# Patient Record
Sex: FEMALE | Race: WHITE | NOT HISPANIC OR LATINO | URBAN - METROPOLITAN AREA
[De-identification: names, ages, dates, MRNs, and addresses within clinical notes are randomized per-mention and may not be internally consistent; named-entity substitution may affect disease eponyms.]

---

## 2018-09-10 ENCOUNTER — EMERGENCY (EMERGENCY)
Facility: HOSPITAL | Age: 28
LOS: 1 days | Discharge: HOME | End: 2018-09-10
Attending: EMERGENCY MEDICINE
Payer: COMMERCIAL

## 2018-09-10 VITALS
TEMPERATURE: 98 F | OXYGEN SATURATION: 99 % | RESPIRATION RATE: 18 BRPM | HEART RATE: 84 BPM | DIASTOLIC BLOOD PRESSURE: 90 MMHG | SYSTOLIC BLOOD PRESSURE: 139 MMHG

## 2018-09-10 VITALS — HEIGHT: 68 IN | WEIGHT: 179.9 LBS

## 2018-09-10 PROCEDURE — 76642 ULTRASOUND BREAST LIMITED: CPT

## 2018-09-10 PROCEDURE — 76642 ULTRASOUND BREAST LIMITED: CPT | Mod: 26,LT

## 2018-09-10 PROCEDURE — 99284 EMERGENCY DEPT VISIT MOD MDM: CPT

## 2018-09-10 RX ORDER — IBUPROFEN 200 MG
600 TABLET ORAL ONCE
Qty: 0 | Refills: 0 | Status: COMPLETED | OUTPATIENT
Start: 2018-09-10 | End: 2018-09-10

## 2018-09-10 RX ORDER — AZTREONAM 2 G
1 VIAL (EA) INJECTION
Qty: 20 | Refills: 0 | OUTPATIENT
Start: 2018-09-10 | End: 2018-09-19

## 2018-09-10 RX ADMIN — Medication 600 MILLIGRAM(S): at 23:56

## 2018-09-10 RX ADMIN — Medication 1 TABLET(S): at 23:56

## 2018-09-10 NOTE — ED STATDOCS - PROGRESS NOTE DETAILS
ANNA LIU: PT evaluated by intake physician. HPI/PE/ROS as noted above. Will follow up plan per intake physician  pending sono for abscess vs cystic structure multiple attempts to contact [facility] and [facility] without success  ALLI GAMEZ, instructed on warm soaks and fu with GYN and referral to [facility] and [facility]

## 2018-09-10 NOTE — ED STATDOCS - NS_ ATTENDINGSCRIBEDETAILS _ED_A_ED_FT
I, Yanely Chowdhury, performed the initial face to face bedside interview with this patient regarding history of present illness, review of symptoms and relevant past medical, social and family history.  I completed an independent physical examination.  I was the initial provider who evaluated this patient. I have signed out the follow up of any pending tests (i.e. labs, radiological studies) to the ACP.  I have communicated the patient’s plan of care and disposition with the ACP.  The history, relevant review of systems, past medical and surgical history, medical decision making, and physical examination was documented by the scribe in my presence and I attest to the accuracy of the documentation.

## 2018-09-10 NOTE — ED ADULT TRIAGE NOTE - CHIEF COMPLAINT QUOTE
"I have a huge lump inside my left nipple and the pain keeps getting worse. " Pt states she feels a ball in the nipple and it feels hard and painful to touch. Pt A & OX4.

## 2018-09-10 NOTE — ED STATDOCS - SKIN, MLM
skin normal color for race, warm, dry and intact. 1x1 cm mass in L breast that is tender to palpation, no drainage from nipple, no lymph nodes in axillae

## 2018-09-10 NOTE — ED STATDOCS - OBJECTIVE STATEMENT
29 y/o F presents to ED c/o painful/tender lump on L breast near the nipple onset 1 week ago. Has not been evaluated by OBGYN or PMD for symptoms. Denies fevers, chills, chest pain, drainage or discharge from the nipple. She is not pregnant or currently breast feeding. History of breast cancer in maternal aunt. No further acute complaints at this time.

## 2018-09-11 PROBLEM — Z00.00 ENCOUNTER FOR PREVENTIVE HEALTH EXAMINATION: Status: ACTIVE | Noted: 2018-09-11

## 2018-10-04 ENCOUNTER — APPOINTMENT (OUTPATIENT)
Dept: SURGERY | Facility: CLINIC | Age: 28
End: 2018-10-04

## 2020-05-20 ENCOUNTER — APPOINTMENT (OUTPATIENT)
Dept: BREAST CENTER | Facility: CLINIC | Age: 30
End: 2020-05-20
Payer: MEDICAID

## 2020-05-20 VITALS
BODY MASS INDEX: 31.83 KG/M2 | SYSTOLIC BLOOD PRESSURE: 135 MMHG | HEIGHT: 68 IN | WEIGHT: 210 LBS | HEART RATE: 76 BPM | DIASTOLIC BLOOD PRESSURE: 89 MMHG

## 2020-05-20 DIAGNOSIS — Z56.0 UNEMPLOYMENT, UNSPECIFIED: ICD-10-CM

## 2020-05-20 DIAGNOSIS — Z80.3 FAMILY HISTORY OF MALIGNANT NEOPLASM OF BREAST: ICD-10-CM

## 2020-05-20 DIAGNOSIS — N61.1 ABSCESS OF THE BREAST AND NIPPLE: ICD-10-CM

## 2020-05-20 DIAGNOSIS — F17.200 NICOTINE DEPENDENCE, UNSPECIFIED, UNCOMPLICATED: ICD-10-CM

## 2020-05-20 DIAGNOSIS — Z86.59 PERSONAL HISTORY OF OTHER MENTAL AND BEHAVIORAL DISORDERS: ICD-10-CM

## 2020-05-20 DIAGNOSIS — F19.11 OTHER PSYCHOACTIVE SUBSTANCE ABUSE, IN REMISSION: ICD-10-CM

## 2020-05-20 DIAGNOSIS — Z80.49 FAMILY HISTORY OF MALIGNANT NEOPLASM OF OTHER GENITAL ORGANS: ICD-10-CM

## 2020-05-20 DIAGNOSIS — Z82.5 FAMILY HISTORY OF ASTHMA AND OTHER CHRONIC LOWER RESPIRATORY DISEASES: ICD-10-CM

## 2020-05-20 DIAGNOSIS — Z81.8 FAMILY HISTORY OF OTHER MENTAL AND BEHAVIORAL DISORDERS: ICD-10-CM

## 2020-05-20 DIAGNOSIS — Z82.49 FAMILY HISTORY OF ISCHEMIC HEART DISEASE AND OTHER DISEASES OF THE CIRCULATORY SYSTEM: ICD-10-CM

## 2020-05-20 PROCEDURE — 99204 OFFICE O/P NEW MOD 45 MIN: CPT | Mod: 25

## 2020-05-20 PROCEDURE — 76642 ULTRASOUND BREAST LIMITED: CPT

## 2020-05-20 RX ORDER — DOXYCYCLINE 100 MG/1
100 CAPSULE ORAL
Refills: 0 | Status: ACTIVE | COMMUNITY

## 2020-05-20 RX ORDER — PROPRANOLOL HYDROCHLORIDE 20 MG/1
20 TABLET ORAL
Refills: 0 | Status: ACTIVE | COMMUNITY

## 2020-05-20 RX ORDER — FLUOXETINE HYDROCHLORIDE 40 MG/1
40 CAPSULE ORAL
Refills: 0 | Status: ACTIVE | COMMUNITY

## 2020-05-20 SDOH — ECONOMIC STABILITY - INCOME SECURITY: UNEMPLOYMENT, UNSPECIFIED: Z56.0

## 2020-05-20 NOTE — PROCEDURE
[FreeTextEntry1] : Targeted L breast US [FreeTextEntry2] : L breast abscess [FreeTextEntry3] : The patient was placed in a supine position, and the left periareolar breast was scanned in both transverse and sagittal orientations. At the 12:00 N1 position is a shallow, elongated anechoic lesion in the dermal layer that is 1.86 x 0.28 x 1.34 cm. No deep fluid collections. This correlates with the findings on physical exam. My impression is that this is a residual abscess fluid collection from her recent infection, BIRADS 2, and completion of antibiotic treatment is recommended.

## 2020-05-20 NOTE — CONSULT LETTER
[Dear  ___] : Dear  [unfilled], [Consult Letter:] : I had the pleasure of evaluating your patient, [unfilled]. [Please see my note below.] : Please see my note below. [Consult Closing:] : Thank you very much for allowing me to participate in the care of this patient.  If you have any questions, please do not hesitate to contact me. [Sincerely,] : Sincerely, [FreeTextEntry3] : Abi Brower MD  [DrMeka  ___] : Dr. GOULD

## 2020-05-20 NOTE — PHYSICAL EXAM
[Normocephalic] : normocephalic [Atraumatic] : atraumatic [EOMI] : extra ocular movement intact [Sclera nonicteric] : sclera nonicteric [Supple] : supple [No Supraclavicular Adenopathy] : no supraclavicular adenopathy [No Cervical Adenopathy] : no cervical adenopathy [Clear to Auscultation Bilat] : clear to auscultation bilaterally [Normal Sinus Rhythm] : normal sinus rhythm [Examined in the supine and seated position] : examined in the supine and seated position [Normal S1, S2] : normal S1 and S2 [Symmetrical] : symmetrical [No dominant masses] : no dominant masses in right breast  [Bra Size: ___] : Bra Size: [unfilled] [No dominant masses] : no dominant masses left breast [No Nipple Retraction] : no left nipple retraction [No Nipple Discharge] : no left nipple discharge [No Axillary Lymphadenopathy] : no left axillary lymphadenopathy [Soft] : abdomen soft [Not Tender] : non-tender [No Edema] : no edema [No Ulceration] : no ulceration [No Rashes] : no rashes [de-identified] : 1 cm area of inflamed tissue. No fluctuance or drainage. No erythema, warmth, minimal tenderness.

## 2020-05-20 NOTE — HISTORY OF PRESENT ILLNESS
[FreeTextEntry1] : Ms. Wolf is a 29 year old woman who presents for a consultation for a left breast abscess. Her breast history is notable for a left breast abscess s/p incision and drainage (Dr. Richy Wang) in early 2018. Six weeks ago, the area of the prior abscess became red and swollen, but resolved with warm compresses after a week; she did not use any antibiotics then. Last Tuesday, she developed redness, pain, and swelling to the size of a grape at the same site. With warm compresses, it spontaneously drained about 1 teaspoonful on Saturday and none since. The redness, pain and swelling have decreased. She was started on Doxycycline this Monday. No fevers or chills. No other breast symptoms.\par \par Her family history is significant for breast cancer in a maternal aunt at 50; the aunt had genetic testing which was negative for BRCA but may have been positive for something else. There is a history of uterine cancer in the maternal grandmother at 65.